# Patient Record
Sex: MALE | Race: WHITE | Employment: PART TIME | URBAN - METROPOLITAN AREA
[De-identification: names, ages, dates, MRNs, and addresses within clinical notes are randomized per-mention and may not be internally consistent; named-entity substitution may affect disease eponyms.]

---

## 2023-08-01 ENCOUNTER — OFFICE VISIT (OUTPATIENT)
Age: 23
End: 2023-08-01

## 2023-08-01 VITALS
OXYGEN SATURATION: 96 % | RESPIRATION RATE: 18 BRPM | WEIGHT: 315 LBS | TEMPERATURE: 98.7 F | SYSTOLIC BLOOD PRESSURE: 153 MMHG | HEART RATE: 103 BPM | DIASTOLIC BLOOD PRESSURE: 99 MMHG
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
## 2023-08-01 DIAGNOSIS — I10 ESSENTIAL HYPERTENSION: ICD-10-CM

## 2023-08-01 DIAGNOSIS — S62.664A CLOSED NONDISPLACED FRACTURE OF DISTAL PHALANX OF RIGHT RING FINGER, INITIAL ENCOUNTER: Primary | ICD-10-CM

## 2023-08-01 DIAGNOSIS — S61.214A LACERATION OF RIGHT RING FINGER WITHOUT FOREIGN BODY WITHOUT DAMAGE TO NAIL, INITIAL ENCOUNTER: ICD-10-CM

## 2023-08-01 RX ORDER — METOPROLOL SUCCINATE 50 MG/1
50 TABLET, EXTENDED RELEASE ORAL DAILY
COMMUNITY
Start: 2023-05-16

## 2023-08-01 RX ORDER — AMOXICILLIN AND CLAVULANATE POTASSIUM 875; 125 MG/1; MG/1
1 TABLET, FILM COATED ORAL 2 TIMES DAILY
Qty: 14 TABLET | Refills: 0 | Status: SHIPPED | OUTPATIENT
Start: 2023-08-01 | End: 2023-08-08

## 2023-08-01 RX ORDER — BUPROPION HYDROCHLORIDE 150 MG/1
150 TABLET, EXTENDED RELEASE ORAL 2 TIMES DAILY
COMMUNITY

## 2023-08-01 RX ORDER — AMLODIPINE BESYLATE 10 MG/1
10 TABLET ORAL DAILY
COMMUNITY
Start: 2023-07-23

## 2023-08-01 NOTE — PROGRESS NOTES
Powell Merlin (:  2000) is a 21 y.o. male,New patient, here for evaluation of the following chief complaint(s):  Animal Bite (C/o dog bite on right pinky finger. Bite occurred last night.)      ASSESSMENT/PLAN:  Visit Diagnoses and Associated Orders       Closed nondisplaced fracture of distal phalanx of right ring finger, initial encounter    -  Primary    [facility] - Cam Quevedo MD, Orthopaedic Surgery (elbow, hand, wrist)[facility] [[id number] Custom]      APPLY FINGER SPLINT,STATIC [83736 CPT(R)]           Laceration of right ring finger without foreign body without damage to nail, initial encounter        XR FINGER RIGHT (MIN 2 VIEWS) [31149 CPT(R)]   - Future Order    amoxicillin-clavulanate (AUGMENTIN) 875-125 MG per tablet [64952]           Dog bite, initial encounter        XR FINGER RIGHT (MIN 2 VIEWS) [94655 CPT(R)]   - Future Order    amoxicillin-clavulanate (AUGMENTIN) 875-125 MG per tablet [84138]      DEBBIE Quevedo MD, Orthopaedic Surgery (elbow, hand, wrist)[facility] [[id number] Custom]           Essential hypertension             ORDERS WITHOUT AN ASSOCIATED DIAGNOSIS    amLODIPine (NORVASC) 10 MG tablet [9069]      metoprolol succinate (TOPROL XL) 50 MG extended release tablet [27694]      buPROPion (WELLBUTRIN SR) 150 MG extended release tablet [61695]            BP elevated today. On treatment. Continue to monitor, recheck with PCP    Laceration does not communicate with bone, suspect fracture is due to crushing impact. Xray - appears to have fracture to distal phalanx - pending final read by radiology, wound cleaned, bacitracin, dressing applied, finger splint   Rest, ice, elevate, otc analgesics. Per orders   Follow up with Ortho in 3-5 days or sooner if symptoms persist or if symptoms worsen. SUBJECTIVE/OBJECTIVE:    Animal Bite        21 y.o. male presents with mother symptoms of right finger pain for 1 day.   He was bit by his aunt's dog yesterday

## 2025-08-02 ENCOUNTER — OFFICE VISIT (OUTPATIENT)
Age: 25
End: 2025-08-02

## 2025-08-02 VITALS
RESPIRATION RATE: 20 BRPM | WEIGHT: 280.4 LBS | HEIGHT: 71 IN | BODY MASS INDEX: 39.26 KG/M2 | SYSTOLIC BLOOD PRESSURE: 130 MMHG | HEART RATE: 86 BPM | DIASTOLIC BLOOD PRESSURE: 82 MMHG | OXYGEN SATURATION: 98 %

## 2025-08-02 DIAGNOSIS — L73.9 FOLLICULITIS: ICD-10-CM

## 2025-08-02 DIAGNOSIS — L23.7 ALLERGIC CONTACT DERMATITIS DUE TO PLANTS, EXCEPT FOOD: Primary | ICD-10-CM

## 2025-08-02 RX ORDER — PREDNISONE 20 MG/1
TABLET ORAL
Qty: 18 TABLET | Refills: 0 | Status: SHIPPED | OUTPATIENT
Start: 2025-08-02 | End: 2025-08-11

## 2025-08-02 RX ORDER — MUPIROCIN 2 %
OINTMENT (GRAM) TOPICAL
Qty: 15 G | Refills: 0 | Status: SHIPPED | OUTPATIENT
Start: 2025-08-02

## 2025-08-02 NOTE — PROGRESS NOTES
Subjective     Chief Complaint   Patient presents with    Poison Mercedes       26yo M c/o itchy rash on face, arms, and legs since doing yard work several days ago. He has also had a red spot on his R lower leg which has been present for over a month that did not resolve with otc antifungal cream.          Past Medical History:   Diagnosis Date    Hypertension        No past surgical history on file.    No family history on file.    Allergies   Allergen Reactions    Environmental/Seasonal Hives and Itching       Social History     Tobacco Use    Smoking status: Never    Smokeless tobacco: Never   Substance Use Topics    Alcohol use: Yes       Vitals:    08/02/25 1327   BP: 130/82   Pulse: 86   Resp: 20   SpO2: 98%       Objective     Physical Exam  Vitals and nursing note reviewed.   Constitutional:       General: He is not in acute distress.     Appearance: Normal appearance. He is not ill-appearing, toxic-appearing or diaphoretic.   Skin:     Coloration: Skin is not jaundiced or pale.      Findings: Erythema and rash present. No bruising.      Comments: Maculopapular rash with occasional vesicle present on face, arms, and legs. There is an isolated area on R leg about 2-3cm in size which multiple erythematous hair follicles and darkened erythema.   Neurological:      Mental Status: He is alert and oriented to person, place, and time.         Assessment & Plan     Diagnoses and all orders for this visit:  Allergic contact dermatitis due to plants, except food  -     predniSONE (DELTASONE) 20 MG tablet; Take 3 tabs PO qday for first three days, 2 tabs PO qday for day 4-6, and 1 tab PO qday for day 7-9  Folliculitis  -     mupirocin (BACTROBAN) 2 % ointment; Apply topically 3 times daily.      No visits with results within 1 Day(s) from this visit.   Latest known visit with results is:   No results found for any previous visit.     Prednisone and mupirocin sent to pharmacy. Wash any bedding, clothing, towels, or sheets

## 2025-08-07 ENCOUNTER — OFFICE VISIT (OUTPATIENT)
Age: 25
End: 2025-08-07

## 2025-08-07 VITALS
SYSTOLIC BLOOD PRESSURE: 148 MMHG | RESPIRATION RATE: 16 BRPM | TEMPERATURE: 98.9 F | OXYGEN SATURATION: 98 % | BODY MASS INDEX: 39.75 KG/M2 | DIASTOLIC BLOOD PRESSURE: 88 MMHG | WEIGHT: 281 LBS | HEART RATE: 77 BPM

## 2025-08-07 DIAGNOSIS — R03.0 ELEVATED BLOOD PRESSURE READING: ICD-10-CM

## 2025-08-07 DIAGNOSIS — L23.7 POISON IVY DERMATITIS: ICD-10-CM

## 2025-08-07 DIAGNOSIS — R21 RASH: Primary | ICD-10-CM

## 2025-08-07 RX ORDER — CEPHALEXIN 500 MG/1
500 CAPSULE ORAL 4 TIMES DAILY
Qty: 28 CAPSULE | Refills: 0 | Status: SHIPPED | OUTPATIENT
Start: 2025-08-07 | End: 2025-08-14

## 2025-08-07 RX ORDER — DEXAMETHASONE SODIUM PHOSPHATE 10 MG/ML
10 INJECTION, SOLUTION INTRA-ARTICULAR; INTRALESIONAL; INTRAMUSCULAR; INTRAVENOUS; SOFT TISSUE ONCE
Status: COMPLETED | OUTPATIENT
Start: 2025-08-07 | End: 2025-08-07

## 2025-08-07 RX ORDER — DEXAMETHASONE 4 MG/1
TABLET ORAL
Qty: 15 TABLET | Refills: 0 | Status: SHIPPED | OUTPATIENT
Start: 2025-08-07 | End: 2025-08-17

## 2025-08-07 RX ADMIN — DEXAMETHASONE SODIUM PHOSPHATE 10 MG: 10 INJECTION, SOLUTION INTRA-ARTICULAR; INTRALESIONAL; INTRAMUSCULAR; INTRAVENOUS; SOFT TISSUE at 12:38

## 2025-08-07 ASSESSMENT — ENCOUNTER SYMPTOMS
ROS SKIN COMMENTS: WORSENING RASH
NAUSEA: 0
VOMITING: 0
ABDOMINAL PAIN: 0
RHINORRHEA: 0
CHEST TIGHTNESS: 0
COUGH: 0
SHORTNESS OF BREATH: 0
SORE THROAT: 0

## 2025-08-09 ENCOUNTER — OFFICE VISIT (OUTPATIENT)
Age: 25
End: 2025-08-09

## 2025-08-09 VITALS
RESPIRATION RATE: 18 BRPM | OXYGEN SATURATION: 97 % | SYSTOLIC BLOOD PRESSURE: 145 MMHG | TEMPERATURE: 97.8 F | BODY MASS INDEX: 39.47 KG/M2 | WEIGHT: 279 LBS | DIASTOLIC BLOOD PRESSURE: 78 MMHG | HEART RATE: 76 BPM

## 2025-08-09 DIAGNOSIS — L03.313 CELLULITIS OF CHEST WALL: ICD-10-CM

## 2025-08-09 DIAGNOSIS — L23.7 POISON IVY DERMATITIS: Primary | ICD-10-CM

## 2025-08-09 RX ORDER — BUPROPION HYDROCHLORIDE 150 MG/1
1 TABLET, EXTENDED RELEASE ORAL
COMMUNITY

## 2025-08-09 RX ORDER — TIRZEPATIDE 7.5 MG/.5ML
INJECTION, SOLUTION SUBCUTANEOUS
COMMUNITY
Start: 2025-08-05

## 2025-08-09 RX ORDER — CEPHALEXIN 500 MG/1
500 CAPSULE ORAL 4 TIMES DAILY
Qty: 28 CAPSULE | Refills: 0 | Status: SHIPPED | OUTPATIENT
Start: 2025-08-09 | End: 2025-08-16

## 2025-08-09 RX ORDER — METOPROLOL SUCCINATE 50 MG/1
50 TABLET, EXTENDED RELEASE ORAL
COMMUNITY